# Patient Record
Sex: FEMALE | Race: ASIAN | NOT HISPANIC OR LATINO
[De-identification: names, ages, dates, MRNs, and addresses within clinical notes are randomized per-mention and may not be internally consistent; named-entity substitution may affect disease eponyms.]

---

## 2017-04-26 ENCOUNTER — APPOINTMENT (OUTPATIENT)
Dept: CARDIOLOGY | Facility: CLINIC | Age: 82
End: 2017-04-26

## 2017-12-03 ENCOUNTER — INPATIENT (INPATIENT)
Facility: HOSPITAL | Age: 82
LOS: 0 days | Discharge: HOSPICE | End: 2017-12-04
Attending: HOSPITALIST | Admitting: HOSPITALIST
Payer: MEDICARE

## 2017-12-03 VITALS
TEMPERATURE: 98 F | RESPIRATION RATE: 20 BRPM | HEART RATE: 98 BPM | SYSTOLIC BLOOD PRESSURE: 122 MMHG | OXYGEN SATURATION: 95 % | DIASTOLIC BLOOD PRESSURE: 92 MMHG

## 2017-12-03 DIAGNOSIS — C22.9 MALIGNANT NEOPLASM OF LIVER, NOT SPECIFIED AS PRIMARY OR SECONDARY: ICD-10-CM

## 2017-12-03 LAB
ALBUMIN SERPL ELPH-MCNC: 3 G/DL — LOW (ref 3.3–5)
ALP SERPL-CCNC: 94 U/L — SIGNIFICANT CHANGE UP (ref 40–120)
ALT FLD-CCNC: 480 U/L — HIGH (ref 4–33)
AST SERPL-CCNC: 293 U/L — HIGH (ref 4–32)
BASE EXCESS BLDV CALC-SCNC: -2 MMOL/L — SIGNIFICANT CHANGE UP
BASOPHILS # BLD AUTO: 0.01 K/UL — SIGNIFICANT CHANGE UP (ref 0–0.2)
BASOPHILS NFR BLD AUTO: 0.2 % — SIGNIFICANT CHANGE UP (ref 0–2)
BASOPHILS NFR SPEC: 0 % — SIGNIFICANT CHANGE UP (ref 0–2)
BILIRUB SERPL-MCNC: 6.5 MG/DL — HIGH (ref 0.2–1.2)
BLOOD GAS VENOUS - CREATININE: 1.84 MG/DL — HIGH (ref 0.5–1.3)
BUN SERPL-MCNC: 43 MG/DL — HIGH (ref 7–23)
BURR CELLS BLD QL SMEAR: PRESENT — SIGNIFICANT CHANGE UP
BURR CELLS BLD QL SMEAR: SLIGHT — SIGNIFICANT CHANGE UP
CALCIUM SERPL-MCNC: 9.2 MG/DL — SIGNIFICANT CHANGE UP (ref 8.4–10.5)
CHLORIDE BLDV-SCNC: 103 MMOL/L — SIGNIFICANT CHANGE UP (ref 96–108)
CHLORIDE SERPL-SCNC: 98 MMOL/L — SIGNIFICANT CHANGE UP (ref 98–107)
CK MB BLD-MCNC: 2.64 NG/ML — SIGNIFICANT CHANGE UP (ref 1–4.7)
CK MB BLD-MCNC: SIGNIFICANT CHANGE UP (ref 0–2.5)
CK SERPL-CCNC: 78 U/L — SIGNIFICANT CHANGE UP (ref 25–170)
CO2 SERPL-SCNC: 21 MMOL/L — LOW (ref 22–31)
CREAT SERPL-MCNC: 1.56 MG/DL — HIGH (ref 0.5–1.3)
EOSINOPHIL # BLD AUTO: 0.02 K/UL — SIGNIFICANT CHANGE UP (ref 0–0.5)
EOSINOPHIL NFR BLD AUTO: 0.4 % — SIGNIFICANT CHANGE UP (ref 0–6)
EOSINOPHIL NFR FLD: 1.8 % — SIGNIFICANT CHANGE UP (ref 0–6)
GAS PNL BLDV: 132 MMOL/L — LOW (ref 136–146)
GIANT PLATELETS BLD QL SMEAR: PRESENT — SIGNIFICANT CHANGE UP
GLUCOSE BLDV-MCNC: 99 — SIGNIFICANT CHANGE UP (ref 70–99)
GLUCOSE SERPL-MCNC: 99 MG/DL — SIGNIFICANT CHANGE UP (ref 70–99)
HCO3 BLDV-SCNC: 23 MMOL/L — SIGNIFICANT CHANGE UP (ref 20–27)
HCT VFR BLD CALC: 27.3 % — LOW (ref 34.5–45)
HCT VFR BLDV CALC: 30.7 % — LOW (ref 34.5–45)
HGB BLD-MCNC: 9.9 G/DL — LOW (ref 11.5–15.5)
HGB BLDV-MCNC: 9.9 G/DL — LOW (ref 11.5–15.5)
IMM GRANULOCYTES # BLD AUTO: 0.02 # — SIGNIFICANT CHANGE UP
IMM GRANULOCYTES NFR BLD AUTO: 0.4 % — SIGNIFICANT CHANGE UP (ref 0–1.5)
INR BLD: 1.1 — SIGNIFICANT CHANGE UP (ref 0.88–1.17)
LACTATE BLDV-MCNC: 1.6 MMOL/L — SIGNIFICANT CHANGE UP (ref 0.5–2)
LIDOCAIN IGE QN: 215.2 U/L — HIGH (ref 7–60)
LYMPHOCYTES # BLD AUTO: 0.7 K/UL — LOW (ref 1–3.3)
LYMPHOCYTES # BLD AUTO: 13.6 % — SIGNIFICANT CHANGE UP (ref 13–44)
LYMPHOCYTES NFR SPEC AUTO: 6.1 % — LOW (ref 13–44)
MACROCYTES BLD QL: SIGNIFICANT CHANGE UP
MCHC RBC-ENTMCNC: 36.3 % — HIGH (ref 32–36)
MCHC RBC-ENTMCNC: 37.8 PG — HIGH (ref 27–34)
MCV RBC AUTO: 104.2 FL — HIGH (ref 80–100)
MICROCYTES BLD QL: SLIGHT — SIGNIFICANT CHANGE UP
MONOCYTES # BLD AUTO: 0.43 K/UL — SIGNIFICANT CHANGE UP (ref 0–0.9)
MONOCYTES NFR BLD AUTO: 8.3 % — SIGNIFICANT CHANGE UP (ref 2–14)
MONOCYTES NFR BLD: 0.9 % — LOW (ref 2–9)
NEUTROPHIL AB SER-ACNC: 87.7 % — HIGH (ref 43–77)
NEUTROPHILS # BLD AUTO: 3.97 K/UL — SIGNIFICANT CHANGE UP (ref 1.8–7.4)
NEUTROPHILS NFR BLD AUTO: 77.1 % — HIGH (ref 43–77)
NRBC # FLD: 0 — SIGNIFICANT CHANGE UP
OVALOCYTES BLD QL SMEAR: SLIGHT — SIGNIFICANT CHANGE UP
PCO2 BLDV: 40 MMHG — LOW (ref 41–51)
PH BLDV: 7.37 PH — SIGNIFICANT CHANGE UP (ref 7.32–7.43)
PLATELET # BLD AUTO: 85 K/UL — LOW (ref 150–400)
PLATELET COUNT - ESTIMATE: SIGNIFICANT CHANGE UP
PMV BLD: 11.6 FL — SIGNIFICANT CHANGE UP (ref 7–13)
PO2 BLDV: 49 MMHG — HIGH (ref 35–40)
POLYCHROMASIA BLD QL SMEAR: SLIGHT — SIGNIFICANT CHANGE UP
POTASSIUM BLDV-SCNC: 5.3 MMOL/L — HIGH (ref 3.4–4.5)
POTASSIUM SERPL-MCNC: 5.1 MMOL/L — SIGNIFICANT CHANGE UP (ref 3.5–5.3)
POTASSIUM SERPL-SCNC: 5.1 MMOL/L — SIGNIFICANT CHANGE UP (ref 3.5–5.3)
PROT SERPL-MCNC: 5.6 G/DL — LOW (ref 6–8.3)
PROTHROM AB SERPL-ACNC: 12.4 SEC — SIGNIFICANT CHANGE UP (ref 9.8–13.1)
RBC # BLD: 2.62 M/UL — LOW (ref 3.8–5.2)
RBC # FLD: 17.3 % — HIGH (ref 10.3–14.5)
SAO2 % BLDV: 79.1 % — SIGNIFICANT CHANGE UP (ref 60–85)
SODIUM SERPL-SCNC: 135 MMOL/L — SIGNIFICANT CHANGE UP (ref 135–145)
TROPONIN T SERPL-MCNC: < 0.06 NG/ML — SIGNIFICANT CHANGE UP (ref 0–0.06)
VARIANT LYMPHS # BLD: 3.5 % — SIGNIFICANT CHANGE UP
WBC # BLD: 5.15 K/UL — SIGNIFICANT CHANGE UP (ref 3.8–10.5)
WBC # FLD AUTO: 5.15 K/UL — SIGNIFICANT CHANGE UP (ref 3.8–10.5)

## 2017-12-03 PROCEDURE — 74176 CT ABD & PELVIS W/O CONTRAST: CPT | Mod: 26

## 2017-12-03 RX ORDER — SODIUM CHLORIDE 9 MG/ML
1000 INJECTION INTRAMUSCULAR; INTRAVENOUS; SUBCUTANEOUS ONCE
Refills: 0 | Status: COMPLETED | OUTPATIENT
Start: 2017-12-03 | End: 2017-12-03

## 2017-12-03 RX ORDER — ACETAMINOPHEN 500 MG
1000 TABLET ORAL ONCE
Refills: 0 | Status: COMPLETED | OUTPATIENT
Start: 2017-12-03 | End: 2017-12-03

## 2017-12-03 RX ORDER — ONDANSETRON 8 MG/1
4 TABLET, FILM COATED ORAL ONCE
Refills: 0 | Status: COMPLETED | OUTPATIENT
Start: 2017-12-03 | End: 2017-12-03

## 2017-12-03 RX ADMIN — Medication 400 MILLIGRAM(S): at 18:18

## 2017-12-03 RX ADMIN — Medication 1000 MILLIGRAM(S): at 19:00

## 2017-12-03 RX ADMIN — ONDANSETRON 4 MILLIGRAM(S): 8 TABLET, FILM COATED ORAL at 18:18

## 2017-12-03 RX ADMIN — SODIUM CHLORIDE 1000 MILLILITER(S): 9 INJECTION INTRAMUSCULAR; INTRAVENOUS; SUBCUTANEOUS at 18:28

## 2017-12-03 NOTE — ED ADULT TRIAGE NOTE - CHIEF COMPLAINT QUOTE
Pt with liver cancer currently no treatment . pt with co constipation x 4 days and decreased appetite. pt is afebrile.

## 2017-12-03 NOTE — ED PROVIDER NOTE - CARE PLAN
Principal Discharge DX:	Liver cancer  Secondary Diagnosis:	Dehydration Principal Discharge DX:	Failure to thrive in adult  Secondary Diagnosis:	Dehydration

## 2017-12-03 NOTE — ED PROVIDER NOTE - OBJECTIVE STATEMENT
89yF hx liver cancer p/w complaint of constipation, vomiting, nausea, inability to tolerate oral diet. Pt states she has not had a BM x 2d and has been unable to tolerate oral diet x 2d 2/2 n/v. Absence of bm came after absence of tolerating po. Denies cp, abd pain, fever, cough, dysuria. Pt appears jaundiced, also notes abd swelling. Has intermittent abd pain x 1 month. Dx with liver ca 3yrs ago.   PMD: Sathya Sykes (not affiliated)  Onc: Bo Hx obtained with Kyrgyz interpretor [id number]  89yF hx liver cancer p/w complaint of constipation, vomiting, nausea, inability to tolerate oral diet. Pt states she has not had a BM x 2d and has been unable to tolerate oral diet x 2d 2/2 n/v. Absence of bm came after absence of tolerating po. Denies cp, abd pain, fever, cough, dysuria. Pt appears jaundiced, also notes abd swelling. Has intermittent abd pain x 1 month. Dx with liver ca 3yrs ago.   PMD: Sathya Sykes (not affiliated)  Onc: Bo

## 2017-12-03 NOTE — ED PROVIDER NOTE - PROGRESS NOTE DETAILS
Sophia: rectal temp 98.1 at length discussion with pt family and  phone, explained ct results, pt and family agree for admission for iv hydration and to set up for home hospice , pt to be DNR

## 2017-12-03 NOTE — ED ADULT NURSE NOTE - OBJECTIVE STATEMENT
Pt awake and alert arrives with co constipation and decreased po intake with co nausea, no vomiting noted. pt is jaundice in color . Pt is afebrile skin intact. iv placed pt awaiting md evaluation son at bedside.

## 2017-12-04 ENCOUNTER — TRANSCRIPTION ENCOUNTER (OUTPATIENT)
Age: 82
End: 2017-12-04

## 2017-12-04 VITALS
TEMPERATURE: 98 F | SYSTOLIC BLOOD PRESSURE: 109 MMHG | HEART RATE: 100 BPM | DIASTOLIC BLOOD PRESSURE: 73 MMHG | OXYGEN SATURATION: 94 % | RESPIRATION RATE: 17 BRPM

## 2017-12-04 DIAGNOSIS — Z51.5 ENCOUNTER FOR PALLIATIVE CARE: ICD-10-CM

## 2017-12-04 DIAGNOSIS — R53.81 OTHER MALAISE: ICD-10-CM

## 2017-12-04 DIAGNOSIS — R62.7 ADULT FAILURE TO THRIVE: ICD-10-CM

## 2017-12-04 DIAGNOSIS — C22.9 MALIGNANT NEOPLASM OF LIVER, NOT SPECIFIED AS PRIMARY OR SECONDARY: ICD-10-CM

## 2017-12-04 DIAGNOSIS — R18.8 OTHER ASCITES: ICD-10-CM

## 2017-12-04 DIAGNOSIS — R10.9 UNSPECIFIED ABDOMINAL PAIN: ICD-10-CM

## 2017-12-04 DIAGNOSIS — Z66 DO NOT RESUSCITATE: ICD-10-CM

## 2017-12-04 PROCEDURE — 99223 1ST HOSP IP/OBS HIGH 75: CPT | Mod: GC

## 2017-12-04 PROCEDURE — 12345: CPT | Mod: NC

## 2017-12-04 RX ORDER — HYDROMORPHONE HYDROCHLORIDE 2 MG/ML
0.5 INJECTION INTRAMUSCULAR; INTRAVENOUS; SUBCUTANEOUS EVERY 4 HOURS
Refills: 0 | Status: DISCONTINUED | OUTPATIENT
Start: 2017-12-04 | End: 2017-12-04

## 2017-12-04 RX ORDER — DOCUSATE SODIUM 100 MG
100 CAPSULE ORAL
Refills: 0 | Status: DISCONTINUED | OUTPATIENT
Start: 2017-12-04 | End: 2017-12-04

## 2017-12-04 RX ORDER — SENNA PLUS 8.6 MG/1
2 TABLET ORAL
Qty: 60 | Refills: 0
Start: 2017-12-04 | End: 2018-01-03

## 2017-12-04 RX ORDER — FUROSEMIDE 40 MG
1 TABLET ORAL
Qty: 30 | Refills: 0
Start: 2017-12-04 | End: 2018-01-03

## 2017-12-04 RX ORDER — SENNA PLUS 8.6 MG/1
2 TABLET ORAL AT BEDTIME
Refills: 0 | Status: DISCONTINUED | OUTPATIENT
Start: 2017-12-04 | End: 2017-12-04

## 2017-12-04 RX ORDER — HYDROMORPHONE HYDROCHLORIDE 2 MG/ML
1 INJECTION INTRAMUSCULAR; INTRAVENOUS; SUBCUTANEOUS
Qty: 20 | Refills: 0
Start: 2017-12-04 | End: 2017-12-09

## 2017-12-04 RX ORDER — HYDROMORPHONE HYDROCHLORIDE 2 MG/ML
2 INJECTION INTRAMUSCULAR; INTRAVENOUS; SUBCUTANEOUS EVERY 6 HOURS
Refills: 0 | Status: DISCONTINUED | OUTPATIENT
Start: 2017-12-04 | End: 2017-12-04

## 2017-12-04 RX ORDER — HYDROMORPHONE HYDROCHLORIDE 2 MG/ML
1 INJECTION INTRAMUSCULAR; INTRAVENOUS; SUBCUTANEOUS EVERY 4 HOURS
Refills: 0 | Status: DISCONTINUED | OUTPATIENT
Start: 2017-12-04 | End: 2017-12-04

## 2017-12-04 RX ORDER — SODIUM CHLORIDE 9 MG/ML
1000 INJECTION INTRAMUSCULAR; INTRAVENOUS; SUBCUTANEOUS
Refills: 0 | Status: DISCONTINUED | OUTPATIENT
Start: 2017-12-04 | End: 2017-12-04

## 2017-12-04 RX ORDER — ONDANSETRON 8 MG/1
4 TABLET, FILM COATED ORAL EVERY 6 HOURS
Refills: 0 | Status: DISCONTINUED | OUTPATIENT
Start: 2017-12-04 | End: 2017-12-04

## 2017-12-04 RX ORDER — FUROSEMIDE 40 MG
20 TABLET ORAL DAILY
Refills: 0 | Status: DISCONTINUED | OUTPATIENT
Start: 2017-12-04 | End: 2017-12-04

## 2017-12-04 RX ORDER — DOCUSATE SODIUM 100 MG
1 CAPSULE ORAL
Qty: 60 | Refills: 0
Start: 2017-12-04 | End: 2018-01-03

## 2017-12-04 RX ORDER — HEPARIN SODIUM 5000 [USP'U]/ML
5000 INJECTION INTRAVENOUS; SUBCUTANEOUS EVERY 8 HOURS
Refills: 0 | Status: DISCONTINUED | OUTPATIENT
Start: 2017-12-04 | End: 2017-12-04

## 2017-12-04 RX ADMIN — SODIUM CHLORIDE 75 MILLILITER(S): 9 INJECTION INTRAMUSCULAR; INTRAVENOUS; SUBCUTANEOUS at 07:28

## 2017-12-04 RX ADMIN — Medication 20 MILLIGRAM(S): at 17:06

## 2017-12-04 RX ADMIN — HEPARIN SODIUM 5000 UNIT(S): 5000 INJECTION INTRAVENOUS; SUBCUTANEOUS at 14:29

## 2017-12-04 NOTE — DISCHARGE NOTE ADULT - CARE PLAN
Principal Discharge DX:	Malignant neoplasm of liver, unspecified liver malignancy type  Goal:	Palliative/home with hospice  Instructions for follow-up, activity and diet:	Please continue current regimen, follow up with hospice network and/or PMD upon discharge.

## 2017-12-04 NOTE — H&P ADULT - HISTORY OF PRESENT ILLNESS
89 F w/ h/o liver cancer, (diagnosed 3 years ago, not treated with chemo or IR guided therapy), p/w progessively worsening N/V, inability to tolerate PO, and constipation x2 days. No fevers, CP, cough, dysuria. Patient and son both want hospice eval with home hospice if possible. They are requesting comfort care, DNR/DNI.     Initial vitals in ED: 97.6, 98, 122/92, 20, 95 on RA. Given 1L NS, zofran and IV tylenol. 89 F w/ h/o liver cancer, (diagnosed 3 years ago, not treated with chemo or IR guided therapy), p/w progessively worsening N/V, inability to tolerate PO, and constipation x2 days. They have noted an increase in the size of her abdomen, said that she was told she had fluid in her stomach but has not had the fluid removed. No fevers, CP, cough, dysuria. Patient and son both want hospice eval with home hospice if possible. They are requesting comfort care, DNR/DNI.     Initial vitals in ED: 97.6, 98, 122/92, 20, 95 on RA. Given 1L NS, zofran and IV tylenol.

## 2017-12-04 NOTE — DISCHARGE NOTE ADULT - HOSPITAL COURSE
89 F w/ h/o liver cancer, (diagnosed 3 years ago, not treated with chemo or IR guided therapy), p/w progressively worsening N/V, inability to tolerate PO, and constipation x2 days likely 2/2 progression of disease. Family and patient requesting hospice eval, comfort care.     Problem/Plan - 1:  ·  Problem: Malignant neoplasm of liver, unspecified liver malignancy type.  Plan: -w/ large volume pelvis and abdominal ascites, now comfort care as per family and patient wishes  -contact procedure team for therapeutic paracentesis  -pain control with dilaudid 0.5 for moderate to severe pain, zofran prn for nausea/vomiting  -contact outpt oncologist for input on patient's disease and outlook  -palliative and hospice eval. -w/ large volume pelvis and abdominal ascites, now comfort care as per family and patient wishses  -contact procedure team for therapeutic paracentesis  -pain control with dilaudid 0.5 for moderate to severe pain  -contact outpt oncologist for input on patient's disease and outlook  -palliative and hospice eval      Problem/Plan - 2:  ·  Problem: Failure to thrive in adult.  Plan: - in setting of her malignancy and worsening ascites  -c/w maintenance  IVF. -c/w maintenance  IVF       12/4 Spoke with attending Dr. Ortega - patient and patient's son wants home with hospice. Hospice paperwork has all been signed 89 F w/ h/o liver cancer, (diagnosed 3 years ago, not treated with chemo or IR guided therapy), p/w progressively worsening N/V, inability to tolerate PO, and constipation x2 days likely 2/2 progression of disease. Family and patient requesting hospice eval, comfort care.     Problem/Plan - 1:  ·  Problem: Malignant neoplasm of liver, unspecified liver malignancy type.  Plan: -w/ large volume pelvis and abdominal ascites, now comfort care as per family and patient wishes  -Dr. Ortega spoke with procedure team - likely does not need paracentesis; patient comfortable appearing  -pain control with dilaudid 0.5 for moderate to severe pain, zofran prn for nausea/vomiting  -palliative and hospice -w/ large volume pelvis and abdominal ascites, now comfort care as per family and patient wishses  -contact procedure team for therapeutic paracentesis  -pain control with dilaudid 0.5 for moderate to severe pain  -contact outpt oncologist for input on patient's disease and outlook  -palliative and hospice eval       Problem/Plan - 2:  ·  Problem: Failure to thrive in adult.  Plan: - in setting of her malignancy and worsening ascites  -c/w maintenance  IVF. -c/w maintenance  IVF       12/4 Spoke with attending Dr. Ortega - patient and patient's son wants home with hospice. Hospice paperwork has all been signed 89 F w/ h/o liver cancer, (diagnosed 3 years ago, not treated with chemo or IR guided therapy), p/w progressively worsening N/V, inability to tolerate PO, and constipation x2 days likely 2/2 progression of disease. Family and patient requesting hospice eval, comfort care.  Pt seen by Hospice, enrolled.  discharged home with hospice services, equipment to be delivered in AM.    -contact procedure team for therapeutic paracentesis  -pain control with dilaudid 0.5 for moderate to severe pain  -contact outpt oncologist for input on patient's disease and outlook  -palliative and hospice eval

## 2017-12-04 NOTE — CONSULT NOTE ADULT - ATTENDING COMMENTS
I have personally seen and examined this patient and agree with the above assessment and plan.   Hospice dc after abdominal tap.

## 2017-12-04 NOTE — CONSULT NOTE ADULT - PROBLEM SELECTOR RECOMMENDATION 3
Patient never received DMT.  Her daughter says she did not want to receive chemo  - Pt is a good candidate for hospice services  - A referral has been made for [facility], will follow up recommendations

## 2017-12-04 NOTE — CHART NOTE - NSCHARTNOTEFT_GEN_A_CORE
Patient seen and examined at bedside with ultrasound.  Abdomen is soft and only mildly distended, unlikely that this amount of ascites contributing significantly to shortness of breath.  Risks and benefits of large volume paracentesis discussed with son and daughter, family discussed with patient and conveyed to primary team desire to be discharge home today without large volume paracentesis.

## 2017-12-04 NOTE — CHART NOTE - NSCHARTNOTEFT_GEN_A_CORE
Pt seen/examined at bedside.  For full note see h & p from same date.    89 F with likely HCC (no path per PMD Dr. Sykes) admitted with constipation and worsening abdominal distention.  Family in favor of comfort care, hospice to evaluate.  Will discuss with procedure team re: LVP.

## 2017-12-04 NOTE — DISCHARGE NOTE ADULT - PLAN OF CARE
Palliative/home with hospice Please continue current regimen, follow up with hospice network and/or PMD upon discharge.

## 2017-12-04 NOTE — H&P ADULT - NSHPREVIEWOFSYSTEMS_GEN_ALL_CORE
REVIEW OF SYSTEMS:    CONSTITUTIONAL:  (+) weakness  EYES/ENT: No visual changes;  no throat pain   NECK: No pain or stiffness  RESPIRATORY: No cough, wheezing, hemoptysis; (+) SOB  CARDIOVASCULAR: No chest pain or palpitations  GASTROINTESTINAL: (+) abn pain,constipation  GENITOURINARY: No dysuria, change in frequency or hematuria  NEUROLOGICAL: No numbness or weakness  SKIN: No itching, burning, rashes, or lesions   All other review of systems is negative unless indicated above. REVIEW OF SYSTEMS:    CONSTITUTIONAL:  (+) weakness  EYES/ENT: No visual changes;  no throat pain   NECK: No pain or stiffness  RESPIRATORY: No cough, wheezing, hemoptysis; (+) SOB  CARDIOVASCULAR: No chest pain or palpitations  GASTROINTESTINAL: (+) abn pain, constipation  GENITOURINARY: No dysuria, change in frequency or hematuria  NEUROLOGICAL: No numbness or weakness  SKIN: No itching, burning, rashes, or lesions   All other review of systems is negative unless indicated above.

## 2017-12-04 NOTE — CONSULT NOTE ADULT - PROBLEM SELECTOR RECOMMENDATION 9
Pain secondary to malignant ascites and likely constipation.    - Continue dilaudid 0.5mg q4 PRN  - Will follow up pain after LVP and likely transition to PO dilaudid, pt and daughter's goal is for pt to go home with hospice  - Start bowel regimen, Colace BID and Senna

## 2017-12-04 NOTE — H&P ADULT - ASSESSMENT
89 F w/ h/o liver cancer, (diagnosed 3 years ago, not treated with chemo or IR guided therapy), p/w progessively worsening N/V, inability to tolerate PO, and constipation x2 days 89 F w/ h/o liver cancer, (diagnosed 3 years ago, not treated with chemo or IR guided therapy), p/w progessively worsening N/V, inability to tolerate PO, and constipation x2 days likely 2/2 progession of disease. Family and patient requesting hospice eval, comfort care: 89 F w/ h/o liver cancer, (diagnosed 3 years ago, not treated with chemo or IR guided therapy), p/w progessively worsening N/V, inability to tolerate PO, and constipation x2 days likely 2/2 progession of disease. Family and patient requesting hospice eval, comfort care.

## 2017-12-04 NOTE — CONSULT NOTE ADULT - SUBJECTIVE AND OBJECTIVE BOX
HPI:  89 F w/ h/o liver cancer, (diagnosed 3 years ago, not treated with chemo or IR guided therapy), p/w progressively worsening N/V, inability to tolerate PO, and constipation x2 days. They have noted an increase in the size of her abdomen, said that she was told she had fluid in her stomach but has not had the fluid removed. No fevers, CP, cough, dysuria. Patient and son both want hospice eval with home hospice if possible. They are requesting comfort care, DNR/DNI.     Initial vitals in ED: 97.6, 98, 122/92, 20, 95 on RA. Given 1L NS, zofran and IV tylenol. (04 Dec 2017 06:00)    SUBJECTIVE:  Palliative Care team consult for goals of care and comfort measures.  Patient is Upper sorbian speaking only but is accompanied by daughter who speaks English.  We used  phone initially, however patient hard of hearing, and verbalized that she defers medical decision making to her daughter.  Patient appears in no acute distress.  She complains of fluid in her abdomen.  Patient and daughter express interest in hospice services and open to speaking to [facility] liaison.  No SOB.  No vomiting, but c/o nausea prior to admission. + constipation.       PERTINENT PMH REVIEWED:  [x ] YES [ ] NO           SOCIAL HISTORY:  Significant other/partner:  [ ] YES  [x ] NO            Children:  [x ] YES  [ ] NO                   Denominational/Spirituality: Upper sorbian, Denominational unknown  Substance hx:  [ ] YES   [ x] NO           Tobacco hx:  [ ] YES  x[ ] NO             Alcohol hx: [ ] YES  [ x] NO        Home Opioid hx:  [ ] YES  [x ] NO   Living Situation: [x ] Home  [ ] Long term care  [ ] Rehab    REFERRALS:   [ ] Chaplaincy  [x ] Hospice  [ ] Child Life  [ x] Social Work  [ ] Case management [ ] Holistic Therapy     FAMILY HISTORY:  No pertinent family history in first degree relatives    [x ] Family history non contributory     BASELINE ADLs (prior to admission):  Independent [ ] moderately [ ] fully   Dependent   [x ] moderately [ ] fully    ADVANCE DIRECTIVES:  [x ] YES [ ] NO   DNR x[ ] YES [ ] NO                      MOLST  [ ] YES [x ] NO    Living Will  [ ] YES [x ] NO    Health Care Proxy [ ] YES  [x ] NO      Patient defers medical decision making to her daughterTerrell.    [x ] Surrogate  [ ] HCP  [ ] Guardian:                                                                  Phone#: [phone]    Allergies    No Known Allergies    Intolerances    MEDICATIONS  (STANDING):  heparin  Injectable 5000 Unit(s) SubCutaneous every 8 hours  sodium chloride 0.9%. 1000 milliLiter(s) (75 mL/Hr) IV Continuous <Continuous>    MEDICATIONS  (PRN):  HYDROmorphone  Injectable 0.5 milliGRAM(s) IV Push every 4 hours PRN Moderate to severe pain  ondansetron Injectable 4 milliGRAM(s) IV Push every 6 hours PRN Nausea and/or Vomiting    PRESENT SYMPTOMS:  Source: [ ] Patient   [ x] Family   [x ] Team     Pain: [x ] YES [ ] NO  Located abdomen, pain is constant, started about one week ago, described as dull and fullness, no radiation, not alleviated or aggravated by anything.    Dyspnea: [ ] YES [x] NO   Anxiety: [ ] YES [x ] NO  Fatigue: [x ] YES [ ] NO   Nausea: [x ] YES [ ] NO  Loss of appetite: [ x] YES [ ] NO   Constipation: [ x] YES [ ] NO     Other Symptoms:  [x ] All other review of systems negative   [ ] Unable to obtain due to poor mentation     Karnofsky Performance Score/Palliative Performance Status Version 2:    30     %  Protein Calorie Malutrition:  [ ] Mild   [ ] Moderate   [ ] Severe     Vital Signs Last 24 Hrs  T(C): 36.7 (04 Dec 2017 08:31), Max: 36.9 (03 Dec 2017 18:19)  T(F): 98 (04 Dec 2017 08:31), Max: 98.5 (03 Dec 2017 18:19)  HR: 98 (04 Dec 2017 10:04) (86 - 101)  BP: 111/76 (04 Dec 2017 10:04) (111/61 - 122/92)  BP(mean): --  RR: 18 (04 Dec 2017 10:04) (16 - 20)  SpO2: 97% (04 Dec 2017 10:04) (95% - 100%)    Physical Exam:    General: [x ] Alert,  A&O x     [ ] lethargic   [ ] Agitated   [ ] Cachexia   HEENT: [ ] Normal   x[ ] Dry mouth   [ ] ET Tube    [ ] Trach   Lungs: [x ] Clear [ ] Rhonchi  [ ] Crackles [ ] Wheezing [ ] Tachypnea  [ ] Audible excessive secretions   Cardiovascular:  [x ] Regular rate and rhythm  [ ] Irregular [ ] Tachycardia   [ ] Bradycardia   Abdomen: [ ] Soft  [x ] Distended, firm  [ ]  [ ] +BS  [x ] Non tender [ ] Tender  [ ]PEG   [ ] NGT   Last BM:     Genitourinary: [ ] Normal [ ] Incontinent   [ ] Oliguria/Anuria   [ ] Smith  Musculoskeletal:  [ ] Normal   [ x] Generalized weakness  [ ] Bedbound   Neurological: [x ] No focal deficits  [ ] Cognitive impairment     Skin: [ x] Normal   [ ] Pressure ulcers     LABS:                        9.9    5.15  )-----------( 85       ( 03 Dec 2017 17:33 )             27.3     12-03    135  |  98  |  43<H>  ----------------------------<  99  5.1   |  21<L>  |  1.56<H>    Ca    9.2      03 Dec 2017 17:33    TPro  5.6<L>  /  Alb  3.0<L>  /  TBili  6.5<H>  /  DBili  x   /  AST  293<H>  /  ALT  480<H>  /  AlkPhos  94  12-03    PT/INR - ( 03 Dec 2017 17:33 )   PT: 12.4 SEC;   INR: 1.10         I&O's Summary    RADIOLOGY & ADDITIONAL STUDIES:    EXAM:  CT ABDOMEN AND PELVIS OC      PROCEDURE DATE:  Dec  3 2017     FINDINGS:    LOWER CHEST: Dilated main pulmonary artery measuring 4.5 cm suggestive of   pulmonary arterial hypertension. Aneurysmal ascending aorta measuring 4.4   cm at the main pulmonary artery.    LIVER: Cirrhotic liver with large heterogeneous left hepatic lobe mass   measuring at least 10.3 x 13 x 13 cm.  BILE DUCTS: Normal caliber.  GALLBLADDER: Within normal limits.  SPLEEN: Within normal limits.  PANCREAS: Within normal limits.  ADRENALS: Within normal limits.  KIDNEYS/URETERS: Within normal limits.    BLADDER: Within normal limits.  REPRODUCTIVE ORGANS: The uterus is unremarkable. No adnexal mass.    BOWEL: Small hiatus hernia. No bowel obstruction.  PERITONEUM: Large volume abdominal and pelvic ascites.   VESSELS:  Tortuous abdominal aorta with atherosclerotic calcification of   the aortoiliac tree. Upper abdominal portosystemic collaterals.  RETROPERITONEUM: No lymphadenopathy.    ABDOMINAL WALL: Generalized subcutaneous edema.  BONES: Diffuse mottling of the osseous structures and marked multilevel   degenerative changes of the spine and mild degenerative changes of the   hips.    IMPRESSION:    No bowel obstruction.    Cirrhotic liver with large heterogeneous left hepatic lobe mass. Evidence   of portal hypertension with portosystemic collaterals.    Large volume abdominal and pelvic ascites.    Dilated main pulmonary artery suggestive of pulmonary arterial   hypertension.    Aneurysmal ascending aorta measuring up to 4.4 cm.

## 2017-12-04 NOTE — CONSULT NOTE ADULT - PROBLEM SELECTOR RECOMMENDATION 5
Palliative team - Dr. Mares, Isa Aceves [facility], and myself met with pt and daughter today.  Both express their goal/wish is to go home.  Daughter is interested in hospice as added support system.  Pt is DNR/I.  Hospice referral has been made - pt likely to go home with hospice s/p LVP procedure.  Will follow up with hospice liaison to discuss safe discharge planning for pt and family.

## 2017-12-04 NOTE — H&P ADULT - NSHPPHYSICALEXAM_GEN_ALL_CORE
Vital Signs Last 24 Hrs  T(C): 36.3 (12-04-17 @ 05:35), Max: 36.9 (12-03-17 @ 18:19)  T(F): 97.4 (12-04-17 @ 05:35), Max: 98.5 (12-03-17 @ 18:19)  HR: 98 (12-04-17 @ 05:35) (86 - 101)  BP: 113/74 (12-04-17 @ 05:35) (111/61 - 122/92)  BP(mean): --  RR: 16 (12-04-17 @ 05:35) (16 - 20)  SpO2: 100% (12-04-17 @ 05:35) (95% - 100%)    GENERAL: NAD  HEENT: EOMI, MMM, scleral icterus   Pulm: normal work of breathing, CTABL  CV: RRR, S1&S2+,   ABDOMEN: distended, tender  MSK: nl ROM  EXTREMITIES:  no appreciable edema in b/l LE  Neuro: no focal defecit   SKIN: warm and dry Vital Signs Last 24 Hrs  T(C): 36.3 (12-04-17 @ 05:35), Max: 36.9 (12-03-17 @ 18:19)  T(F): 97.4 (12-04-17 @ 05:35), Max: 98.5 (12-03-17 @ 18:19)  HR: 98 (12-04-17 @ 05:35) (86 - 101)  BP: 113/74 (12-04-17 @ 05:35) (111/61 - 122/92)  BP(mean): --  RR: 16 (12-04-17 @ 05:35) (16 - 20)  SpO2: 100% (12-04-17 @ 05:35) (95% - 100%)    GENERAL: NAD  HEENT: EOMI, MMM, scleral icterus   Pulm: normal work of breathing, CTABL  CV: RRR, S1&S2+  ABDOMEN: distended, tender  MSK: nl ROM  BACK: No back pain  EXTREMITIES:  no appreciable edema in b/l LE  Neuro: no focal defecit   SKIN: warm and dry

## 2017-12-04 NOTE — H&P ADULT - PROBLEM SELECTOR PLAN 1
-w/ abdominal ascites   -contact procedure team for therapeutic paracentesis  -pain control with dilaudid 0.5 for moderate pain, 1 mg severe pain   -contact outpt oncologist   -palliative and hospice lexus -w/ abdominal ascites, now comfort care    -contact procedure team for therapeutic paracentesis  -pain control with dilaudid 0.5 for moderate pain, 1 mg severe pain   -contact outpt oncologist   -palliative and hospice lexus -w/ large volume pelvis and abdominal ascites, now comfort care as per family and patient wishses  -contact procedure team for therapeutic paracentesis  -pain control with dilaudid 0.5 for moderate to severe pain  -contact outpt oncologist for input on patient's disease and outlook  -palliative and hospice eval -w/ large volume pelvis and abdominal ascites, now comfort care as per family and patient wishses  -contact procedure team for therapeutic paracentesis  -pain control with dilaudid 0.5 for moderate to severe pain, zofran prn for nausea/vomiting  -contact outpt oncologist for input on patient's disease and outlook  -palliative and hospice eval

## 2017-12-04 NOTE — DISCHARGE NOTE ADULT - MEDICATION SUMMARY - MEDICATIONS TO TAKE
I will START or STAY ON the medications listed below when I get home from the hospital:    HYDROmorphone 2 mg oral tablet  -- 1 tab(s) by mouth every 6 hours, As needed, mild/mod/severe pain MDD:no more than 4 tabs within 24 hour  -- Indication: For Malignant neoplasm of liver via hospice    furosemide 20 mg oral tablet  -- 1 tab(s) by mouth once a day  -- Indication: For For shortness of breathe - diuretic - comfort care    senna oral tablet  -- 2 tab(s) by mouth once a day (at bedtime)  -- Indication: For stool softener    docusate sodium 100 mg oral capsule  -- 1 cap(s) by mouth 2 times a day, As needed, Constipation  -- Indication: For stool softener

## 2017-12-04 NOTE — H&P ADULT - NSHPLABSRESULTS_GEN_ALL_CORE
9.9    5.15  )-----------( 85       ( 03 Dec 2017 17:33 )             27.3       12-03    135  |  98  |  43<H>  ----------------------------<  99  5.1   |  21<L>  |  1.56<H>    Ca    9.2      03 Dec 2017 17:33    TPro  5.6<L>  /  Alb  3.0<L>  /  TBili  6.5<H>  /  DBili  x   /  AST  293<H>  /  ALT  480<H>  /  AlkPhos  94  12-03                  PT/INR - ( 03 Dec 2017 17:33 )   PT: 12.4 SEC;   INR: 1.10          Lactate Trend      CARDIAC MARKERS ( 03 Dec 2017 17:33 )  x     / < 0.06 ng/mL / 78 u/L / 2.64 ng/mL / x        CAPILLARY BLOOD GLUCOSE      Radiology:  CXR pending     < from: CT Abdomen and Pelvis w/ Oral Cont (12.03.17 @ 19:27) >    IMPRESSION:    No bowel obstruction.    Cirrhotic liver with large heterogeneous left hepatic lobe mass. Evidence   of portal hypertension with portosystemic collaterals.    Large volume abdominal and pelvic ascites.    Dilated main pulmonary artery suggestive of pulmonary arterial   hypertension.    Aneurysmal ascending aorta measuring up to 4.4 cm.      < end of copied text > LABS and ADDITIONAL STUDIES:               9.9    5.15  )-----------( 85       ( 03 Dec 2017 17:33 )             27.3     12-03    135  |  98  |  43<H>  ----------------------------<  99  5.1   |  21<L>  |  1.56<H>    Ca    9.2      03 Dec 2017 17:33    TPro  5.6<L>  /  Alb  3.0<L>  /  TBili  6.5<H>  /  DBili  x   /  AST  293<H>  /  ALT  480<H>  /  AlkPhos  94  12-03        PT/INR - ( 03 Dec 2017 17:33 )   PT: 12.4 SEC;   INR: 1.10       CARDIAC MARKERS ( 03 Dec 2017 17:33 )  x     / < 0.06 ng/mL / 78 u/L / 2.64 ng/mL / x        Radiology:  CXR pending     < from: CT Abdomen and Pelvis w/ Oral Cont (12.03.17 @ 19:27) >    IMPRESSION:    No bowel obstruction.    Cirrhotic liver with large heterogeneous left hepatic lobe mass. Evidence   of portal hypertension with portosystemic collaterals.    Large volume abdominal and pelvic ascites.    Dilated main pulmonary artery suggestive of pulmonary arterial   hypertension.    Aneurysmal ascending aorta measuring up to 4.4 cm.      < end of copied text >

## 2017-12-04 NOTE — CONSULT NOTE ADULT - ASSESSMENT
89 F w/ h/o liver cancer, (diagnosed 3 years ago, not treated with chemo or IR guided therapy), p/w progressively worsening N/V, inability to tolerate PO, and constipation x2 days likely 2/2 progression of disease.  Palliative Care consult for goals of care and possible hospice.

## 2017-12-04 NOTE — CHART NOTE - NSCHARTNOTEFT_GEN_A_CORE
spoke to family, desire to take patient home today.  Discussed with Hospice nurse, equipment to be delivered in AM.  spoke with procedure team, ascites present but likely does not need LVP.  DC time 45 minutes

## 2017-12-04 NOTE — DISCHARGE NOTE ADULT - PATIENT PORTAL LINK FT
“You can access the Follow[employer] Patient Portal, offered by [facility], by registering with the following website: [email or web address]”

## 2020-10-23 NOTE — ED PROVIDER NOTE - INPATIENT RESIDENT/ACP NOTIFIED
Julian Isabel (MD)  Obstetrics and Gynecology  [facility] [facility], [address]th [place] [address]  Phone: [phone]  Fax: [phone]  Follow Up Time:    MAR

## 2021-06-08 NOTE — DISCHARGE NOTE ADULT - VISION (WITH CORRECTIVE LENSES IF THE PATIENT USUALLY WEARS THEM):
Normal vision: sees adequately in most situations; can see medication labels, newsprint Patient information on fall and injury prevention Partially impaired: cannot see medication labels or newsprint, but can see obstacles in path, and the surrounding layout; can count fingers at arm's length
